# Patient Record
Sex: FEMALE | Race: WHITE | NOT HISPANIC OR LATINO | Employment: FULL TIME | URBAN - METROPOLITAN AREA
[De-identification: names, ages, dates, MRNs, and addresses within clinical notes are randomized per-mention and may not be internally consistent; named-entity substitution may affect disease eponyms.]

---

## 2022-07-24 ENCOUNTER — HOSPITAL ENCOUNTER (EMERGENCY)
Facility: MEDICAL CENTER | Age: 47
End: 2022-07-24
Attending: EMERGENCY MEDICINE
Payer: COMMERCIAL

## 2022-07-24 VITALS
DIASTOLIC BLOOD PRESSURE: 89 MMHG | WEIGHT: 246.03 LBS | OXYGEN SATURATION: 95 % | BODY MASS INDEX: 38.62 KG/M2 | HEIGHT: 67 IN | SYSTOLIC BLOOD PRESSURE: 144 MMHG | RESPIRATION RATE: 18 BRPM | TEMPERATURE: 98 F | HEART RATE: 67 BPM

## 2022-07-24 DIAGNOSIS — G43.901 MIGRAINE WITH STATUS MIGRAINOSUS, NOT INTRACTABLE, UNSPECIFIED MIGRAINE TYPE: ICD-10-CM

## 2022-07-24 LAB — HCG UR QL: NEGATIVE

## 2022-07-24 PROCEDURE — 81025 URINE PREGNANCY TEST: CPT

## 2022-07-24 PROCEDURE — 96375 TX/PRO/DX INJ NEW DRUG ADDON: CPT

## 2022-07-24 PROCEDURE — 99284 EMERGENCY DEPT VISIT MOD MDM: CPT

## 2022-07-24 PROCEDURE — 700105 HCHG RX REV CODE 258: Performed by: EMERGENCY MEDICINE

## 2022-07-24 PROCEDURE — 700111 HCHG RX REV CODE 636 W/ 250 OVERRIDE (IP): Performed by: EMERGENCY MEDICINE

## 2022-07-24 PROCEDURE — 96374 THER/PROPH/DIAG INJ IV PUSH: CPT

## 2022-07-24 RX ORDER — KETOROLAC TROMETHAMINE 30 MG/ML
30 INJECTION, SOLUTION INTRAMUSCULAR; INTRAVENOUS ONCE
Status: COMPLETED | OUTPATIENT
Start: 2022-07-24 | End: 2022-07-24

## 2022-07-24 RX ORDER — SODIUM CHLORIDE 9 MG/ML
1000 INJECTION, SOLUTION INTRAVENOUS ONCE
Status: COMPLETED | OUTPATIENT
Start: 2022-07-24 | End: 2022-07-24

## 2022-07-24 RX ORDER — METOCLOPRAMIDE HYDROCHLORIDE 5 MG/ML
10 INJECTION INTRAMUSCULAR; INTRAVENOUS ONCE
Status: COMPLETED | OUTPATIENT
Start: 2022-07-24 | End: 2022-07-24

## 2022-07-24 RX ORDER — LEVOTHYROXINE SODIUM 0.1 MG/1
200 TABLET ORAL
COMMUNITY

## 2022-07-24 RX ORDER — DIPHENHYDRAMINE HYDROCHLORIDE 50 MG/ML
25 INJECTION INTRAMUSCULAR; INTRAVENOUS ONCE
Status: COMPLETED | OUTPATIENT
Start: 2022-07-24 | End: 2022-07-24

## 2022-07-24 RX ADMIN — SODIUM CHLORIDE 1000 ML: 9 INJECTION, SOLUTION INTRAVENOUS at 09:32

## 2022-07-24 RX ADMIN — DIPHENHYDRAMINE HYDROCHLORIDE 25 MG: 50 INJECTION INTRAMUSCULAR; INTRAVENOUS at 09:26

## 2022-07-24 RX ADMIN — KETOROLAC TROMETHAMINE 30 MG: 30 INJECTION, SOLUTION INTRAMUSCULAR; INTRAVENOUS at 09:36

## 2022-07-24 RX ADMIN — METOCLOPRAMIDE 10 MG: 5 INJECTION, SOLUTION INTRAMUSCULAR; INTRAVENOUS at 09:26

## 2022-07-24 ASSESSMENT — PAIN DESCRIPTION - PAIN TYPE: TYPE: ACUTE PAIN

## 2022-07-24 NOTE — ED PROVIDER NOTES
ED Provider Note    Scribed for Amado Rincon M.D. by Jo Puente. 7/24/2022  8:58 AM    Primary care provider: None noted.  Means of arrival: Walk-In  History obtained from: Patient  History limited by: None    CHIEF COMPLAINT  Chief Complaint   Patient presents with    Headache     Pt states she woke up with a headache in the right side of her head.  Pt states it feels like a migraine.  Pt states she is also nauseous.         HPI  Marisabel Rajan is a 47 y.o. female who presents to the Emergency Department for moderate headache onset this morning when she woke up.  Gradual onset.  Not thunderclap.  The pain is localized to the right side of her head, but she also feels it behind her eye and at the base of her neck. She notes associated tingling on the right side of her head and face and moderate nausea. She tried taking Tylenol and laying down with no alleviation. Patient lives in [facility] and notes she drove 8 hours Friday night to get here and didn't get to sleep until 4am yesterday. Additionally, she spent most of yesterday at [facility] and in the sun. Patient doesn't have an official diagnosis of migraines, and hasn't had one in a long time. She denies recent head trauma or known migraine triggers.       REVIEW OF SYSTEMS  Pertinent positives include headache, facial tinging, nausea.   Pertinent negatives include no head injury.    All other systems reviewed and negative. See HPI for further details.       PAST MEDICAL HISTORY   has a past medical history of Asthma and Hyperthyroidism.    SURGICAL HISTORY  patient denies any surgical history    SOCIAL HISTORY  Social History     Tobacco Use    Smoking status: Never Smoker    Smokeless tobacco: Never Used   Substance Use Topics    Alcohol use: Yes     Comment: occ    Drug use: Never      Social History     Substance and Sexual Activity   Drug Use Never       FAMILY HISTORY  History reviewed. No pertinent family history.    CURRENT  "MEDICATIONS  Home Medications       Reviewed by Rick Chavez R.N. (Registered Nurse) on 07/24/22 at 0702  Med List Status: Not Addressed     Medication Last Dose Status   albuterol (PROVENTIL) 2.5mg/0.5ml Nebu Soln  Active   levothyroxine (SYNTHROID) 100 MCG Tab  Active                    ALLERGIES  Allergies   Allergen Reactions    Penicillins Hives     Breaks out in hives        PHYSICAL EXAM  VITAL SIGNS: BP (!) 149/98   Pulse 99   Temp 36.2 °C (97.1 °F) (Temporal)   Resp 16   Ht 1.702 m (5' 7\")   Wt 112 kg (246 lb 0.5 oz)   LMP 07/06/2022   SpO2 93%   BMI 38.53 kg/m²     Nursing note and vitals reviewed.  Constitutional: Well-developed and well-nourished. No distress.   HENT: Head is normocephalic and atraumatic. Oropharynx is clear and moist without exudate or erythema.   Eyes: Pupils are equal, round, and reactive to light. Conjunctiva are normal.   Cardiovascular: Normal rate and regular rhythm. No murmur heard. Normal radial pulses.  Pulmonary/Chest: Breath sounds normal. No wheezes or rales.   Abdominal: Soft and non-tender. No distention    Musculoskeletal: Extremities exhibit normal range of motion without edema or tenderness.   Neurological: Awake, alert and oriented to person, place, and time. No focal deficits noted.  Skin: Skin is warm and dry. No rash.   Psychiatric: Normal mood and affect. Appropriate for clinical situation.      COURSE & MEDICAL DECISION MAKING  Nursing notes, VS, PMSFHx reviewed in chart.       8:58 AM - Patient seen and examined at bedside. Patient will be treated with NS 1,000mL, Toradol 30mg, Reglan 10mg, and Benadryl 25 mg. Intravenous fluids were administered for symptomatic treatment of migraine. The differential diagnoses include but are not limited to: Migraine headache, tension headache    9:45 AM - Patient just received her medication. I will recheck in an hour to see if symptoms have improved.    10:35 AM - I reevaluated the patient at bedside. She feels " significantly improved and would like to go home. I istructed her to return if symptoms recur. She is stable for discharge at this time.      HYDRATION: Based on the patient's presentation of Other symptomatic treatment of migraine the patient was given IV fluids. IV Hydration was used because oral hydration was not adequate alone. Upon recheck following hydration, the patient was improved.  HTN/IDDM FOLLOW UP:  The patient is referred to a primary physician for blood pressure management, diabetic screening, and for all other preventive health concerns    The patient will return for new or worsening symptoms and is stable at the time of discharge.    The patient is referred to a primary physician for blood pressure management, diabetic screening, and for all other preventative health concerns.    DISPOSITION:  Patient will be discharged home in stable condition.    FOLLOW UP:  [facility], Emergency Dept  [address] [place] [id number] [phone]        FINAL IMPRESSION  1. Migraine with status migrainosus, not intractable, unspecified migraine type          I, Jo Puente (Scribe), am scribing for, and in the presence of, Amado Rincon M.D..    Electronically signed by: Jo Puente (Scribe), 7/24/2022    I, Amado Rincon M.D. personally performed the services described in this documentation, as scribed by Jo Puente in my presence, and it is both accurate and complete.    The note accurately reflects work and decisions made by me.  Amado Rincon M.D.  7/24/2022  11:05 AM

## 2022-07-24 NOTE — ED TRIAGE NOTES
Chief Complaint   Patient presents with   • Headache     Pt states she woke up with a headache in the right side of her head.  Pt states it feels like a migraine.  Pt states she is also nauseous.       Pt ambulatory to triage for above complaint.     Pt is alert/oriented and follows commands. Pt speaking in full sentences and responds appropriately to questions. No acute distress noted in triage and respirations are even and unlabored.     Pt placed in lobby and educated on triage process. Pt encouraged to alert staff for any changes in condition.